# Patient Record
Sex: MALE | Race: WHITE | URBAN - METROPOLITAN AREA
[De-identification: names, ages, dates, MRNs, and addresses within clinical notes are randomized per-mention and may not be internally consistent; named-entity substitution may affect disease eponyms.]

---
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
## 2025-01-06 ENCOUNTER — TELEPHONE (OUTPATIENT)
Dept: PODIATRY | Age: 20
End: 2025-01-06

## 2025-01-06 NOTE — TELEPHONE ENCOUNTER
Patient mom called in today, would like a call back regarding patients infected toe nail. Would like a sooner appt if possible.  best call back is [phone]

## 2025-01-07 ENCOUNTER — OFFICE VISIT (OUTPATIENT)
Dept: PODIATRY | Age: 20
End: 2025-01-07
Payer: COMMERCIAL

## 2025-01-07 VITALS
OXYGEN SATURATION: 98 % | TEMPERATURE: 97.1 F | HEART RATE: 83 BPM | DIASTOLIC BLOOD PRESSURE: 70 MMHG | SYSTOLIC BLOOD PRESSURE: 120 MMHG | WEIGHT: 213 LBS

## 2025-01-07 DIAGNOSIS — M79.674 PAIN IN RIGHT TOE(S): ICD-10-CM

## 2025-01-07 DIAGNOSIS — L60.0 INGROWN NAIL: Primary | ICD-10-CM

## 2025-01-07 PROCEDURE — 99213 OFFICE O/P EST LOW 20 MIN: CPT | Performed by: PODIATRIST

## 2025-01-07 PROCEDURE — 11750 EXCISION NAIL&NAIL MATRIX: CPT | Performed by: PODIATRIST

## 2025-01-07 RX ORDER — LIDOCAINE HYDROCHLORIDE 20 MG/ML
3 INJECTION, SOLUTION INFILTRATION; PERINEURAL ONCE
Status: COMPLETED | OUTPATIENT
Start: 2025-01-07 | End: 2025-01-07

## 2025-01-07 RX ADMIN — LIDOCAINE HYDROCHLORIDE 3 ML: 20 INJECTION, SOLUTION INFILTRATION; PERINEURAL at 14:09

## 2025-01-07 NOTE — PROGRESS NOTES
[facility]  [address] [address]  Dept: [phone]  Dept Fax: [phone]  Loc: [phone]     [facility] TOENAIL NOTE  Date of patient's visit: 1/7/2025  Patient's Name:  Willam Florence YOB: 2005            Patient Care Team:  Rajeev Herbert MD as PCP - General (Family Medicine)      Chief Complaint   Patient presents with    Ingrown Toenail     Pt mom called yesterday has a sore ingrown toenail  Rajeev Herbert MD  12/23/4        Willam Florence 19 y.o. male that presents complaining of a painful ingrown toenail on the 1st Right toes. Conservative therapy has failed.    Symptoms began 2 month(s) ago.  Patient relates pain is Present.  Pain is rated 8 out of 10 and is described as waxing and waning.  Treatments prior to today's visit include: .  Currently denies F/C/N/V.     No Known Allergies    Past Medical History:   Diagnosis Date    Tinea barbae and tinea capitis        Prior to Admission medications    Not on File       No past surgical history on file.    No family history on file.    Social History     Tobacco Use    Smoking status: Never    Smokeless tobacco: Never   Substance Use Topics    Alcohol use: Not on file       Lower Extremity Physical Examination:     Vitals:   Vitals:    01/07/25 1307   BP: 120/70   Pulse: 83   Temp: 97.1 °F (36.2 °C)   SpO2: 98%     General: AAO x 3 in NAD.     Integument: Incurvated toenail with localized swelling, pain, erythema, pain with palpation and shoe gear  1st Right      Vascular: DP and PT pulses palpable 2/4, Bilateral.  CFT <1 seconds, Neurological: Musculoskeletal:       Asessment: Patient is a 19 y.o. male with:   Encounter Diagnoses   Name Primary?    Ingrown nail Yes    Pain in right toe(s)     Willam was seen today for ingrown toenail.    Diagnoses and all orders for this visit:    Ingrown nail  -     lidocaine 2 % injection 3 mL    Pain in right

## 2025-01-07 NOTE — PATIENT INSTRUCTIONS
Starting day #2 soak foot in 1 Tablespoon of epsom salt and warm water for 15 minutes per day until Dr Laboy tells you to stop soaking.    Apply Neosporin or triple antibiotic ointment and a Band-Aid.     Any Questions feel free to contact Millie CHOI at [phone]    Dressing: bandage

## 2025-01-22 ENCOUNTER — OFFICE VISIT (OUTPATIENT)
Dept: PODIATRY | Age: 20
End: 2025-01-22
Payer: COMMERCIAL

## 2025-01-22 VITALS
SYSTOLIC BLOOD PRESSURE: 128 MMHG | WEIGHT: 213 LBS | HEART RATE: 89 BPM | DIASTOLIC BLOOD PRESSURE: 79 MMHG | OXYGEN SATURATION: 99 % | TEMPERATURE: 97.9 F

## 2025-01-22 DIAGNOSIS — L60.0 INGROWN NAIL: Primary | ICD-10-CM

## 2025-01-22 PROCEDURE — 99212 OFFICE O/P EST SF 10 MIN: CPT | Performed by: PODIATRIST

## 2025-01-22 NOTE — PROGRESS NOTES
visit:    Ingrown nail             Plan:  Patient examined and evaluated.  Current condition and treatment options discussed in detail.  Advised pt to soak qd x 1 weak leave open to air  betadine qd  .  Verbal and written instructions given to patient.  Orders: No orders of the defined types were placed in this encounter.     Contact office with any questions/problems/concerns.  RTC in 6 week(s).

## 2025-03-24 ENCOUNTER — HOSPITAL ENCOUNTER (OUTPATIENT)
Dept: GENERAL RADIOLOGY | Age: 20
Discharge: HOME | End: 2025-03-26
Payer: COMMERCIAL

## 2025-03-24 ENCOUNTER — HOSPITAL ENCOUNTER (OUTPATIENT)
Age: 20
Discharge: HOME | End: 2025-03-26
Payer: COMMERCIAL

## 2025-03-24 DIAGNOSIS — M25.571 PAIN, JOINT, ANKLE, RIGHT: ICD-10-CM

## 2025-03-24 PROCEDURE — 73610 X-RAY EXAM OF ANKLE: CPT
